# Patient Record
Sex: MALE
[De-identification: names, ages, dates, MRNs, and addresses within clinical notes are randomized per-mention and may not be internally consistent; named-entity substitution may affect disease eponyms.]

---

## 2019-10-30 ENCOUNTER — HOSPITAL ENCOUNTER (OUTPATIENT)
Dept: HOSPITAL 90 - 3DH | Age: 59
Setting detail: OBSERVATION
Discharge: HOME | End: 2019-10-30
Attending: INTERNAL MEDICINE | Admitting: INTERNAL MEDICINE
Payer: COMMERCIAL

## 2019-10-30 VITALS — SYSTOLIC BLOOD PRESSURE: 141 MMHG | DIASTOLIC BLOOD PRESSURE: 71 MMHG

## 2019-10-30 VITALS — DIASTOLIC BLOOD PRESSURE: 68 MMHG | SYSTOLIC BLOOD PRESSURE: 132 MMHG

## 2019-10-30 VITALS — SYSTOLIC BLOOD PRESSURE: 129 MMHG | DIASTOLIC BLOOD PRESSURE: 58 MMHG

## 2019-10-30 VITALS — WEIGHT: 148.1 LBS | BODY MASS INDEX: 27.25 KG/M2 | HEIGHT: 62 IN

## 2019-10-30 VITALS — SYSTOLIC BLOOD PRESSURE: 144 MMHG | DIASTOLIC BLOOD PRESSURE: 69 MMHG

## 2019-10-30 VITALS — SYSTOLIC BLOOD PRESSURE: 132 MMHG | DIASTOLIC BLOOD PRESSURE: 71 MMHG

## 2019-10-30 DIAGNOSIS — Z79.899: ICD-10-CM

## 2019-10-30 DIAGNOSIS — E11.9: ICD-10-CM

## 2019-10-30 DIAGNOSIS — R00.1: ICD-10-CM

## 2019-10-30 DIAGNOSIS — I10: ICD-10-CM

## 2019-10-30 DIAGNOSIS — Z79.84: ICD-10-CM

## 2019-10-30 DIAGNOSIS — K85.90: Primary | ICD-10-CM

## 2019-10-30 DIAGNOSIS — E78.5: ICD-10-CM

## 2019-10-30 DIAGNOSIS — R11.2: ICD-10-CM

## 2019-10-30 LAB
ALBUMIN SERPL-MCNC: 3.7 G/DL (ref 3.5–5)
ALT SERPL-CCNC: 19 U/L (ref 12–78)
AST SERPL-CCNC: 17 U/L (ref 10–37)
BASOPHILS NFR BLD AUTO: 0.2 % (ref 0–5)
BILIRUB SERPL-MCNC: 1.4 MG/DL (ref 0.2–1)
BUN SERPL-MCNC: 11 MG/DL (ref 7–18)
CHLORIDE SERPL-SCNC: 105 MMOL/L (ref 101–111)
CO2 SERPL-SCNC: 27 MMOL/L (ref 21–32)
CREAT SERPL-MCNC: 0.9 MG/DL (ref 0.5–1.5)
EOSINOPHIL NFR BLD AUTO: 1.6 % (ref 0–8)
ERYTHROCYTE [DISTWIDTH] IN BLOOD BY AUTOMATED COUNT: 13.2 % (ref 11–15.5)
GFR SERPL CREATININE-BSD FRML MDRD: 91 ML/MIN (ref 60–?)
GLUCOSE SERPL-MCNC: 151 MG/DL (ref 70–105)
HCT VFR BLD AUTO: 37.5 % (ref 42–54)
LYMPHOCYTES NFR SPEC AUTO: 20.9 % (ref 21–51)
MCH RBC QN AUTO: 30.5 PG (ref 27–33)
MCHC RBC AUTO-ENTMCNC: 34.8 G/DL (ref 32–36)
MCV RBC AUTO: 87.6 FL (ref 79–99)
MONOCYTES NFR BLD AUTO: 10.4 % (ref 3–13)
NEUTROPHILS NFR BLD AUTO: 66.9 % (ref 40–77)
NRBC BLD MANUAL-RTO: 0.1 % (ref 0–0.19)
PH UR STRIP: 7.5 [PH] (ref 5–8)
PLATELET # BLD AUTO: 187 K/UL (ref 130–400)
POTASSIUM SERPL-SCNC: 3.9 MMOL/L (ref 3.5–5.1)
PROT SERPL-MCNC: 6 G/DL (ref 6–8.3)
RBC # BLD AUTO: 4.28 MIL/UL (ref 4.5–6.2)
SODIUM SERPL-SCNC: 140 MMOL/L (ref 136–145)
SP GR UR STRIP: 1.05 (ref 1–1.03)
UROBILINOGEN UR STRIP-MCNC: 0.2 MG/DL (ref 0.2–1)
WBC # BLD AUTO: 8.1 K/UL (ref 4.8–10.8)

## 2019-10-30 PROCEDURE — 93005 ELECTROCARDIOGRAM TRACING: CPT

## 2019-10-30 PROCEDURE — 80053 COMPREHEN METABOLIC PANEL: CPT

## 2019-10-30 PROCEDURE — 84484 ASSAY OF TROPONIN QUANT: CPT

## 2019-10-30 PROCEDURE — 36415 COLL VENOUS BLD VENIPUNCTURE: CPT

## 2019-10-30 PROCEDURE — 96374 THER/PROPH/DIAG INJ IV PUSH: CPT

## 2019-10-30 PROCEDURE — 82948 REAGENT STRIP/BLOOD GLUCOSE: CPT

## 2019-10-30 PROCEDURE — 85025 COMPLETE CBC W/AUTO DIFF WBC: CPT

## 2019-10-30 PROCEDURE — 96361 HYDRATE IV INFUSION ADD-ON: CPT

## 2019-10-30 PROCEDURE — 96372 THER/PROPH/DIAG INJ SC/IM: CPT

## 2019-10-30 PROCEDURE — 81003 URINALYSIS AUTO W/O SCOPE: CPT

## 2019-10-30 RX ADMIN — SODIUM CHLORIDE SCH MLS/MIN: 0.9 INJECTION, SOLUTION INTRAVENOUS at 02:09

## 2019-10-30 RX ADMIN — SODIUM CHLORIDE SCH MLS/MIN: 0.9 INJECTION, SOLUTION INTRAVENOUS at 06:17

## 2019-10-30 RX ADMIN — SODIUM CHLORIDE SCH MLS/MIN: 0.9 INJECTION, SOLUTION INTRAVENOUS at 06:04

## 2019-10-30 RX ADMIN — SODIUM CHLORIDE SCH MLS/MIN: 0.9 INJECTION, SOLUTION INTRAVENOUS at 06:09

## 2019-10-30 RX ADMIN — SODIUM CHLORIDE SCH UNIT: 9 INJECTION, SOLUTION INTRAVENOUS at 11:30

## 2019-10-30 RX ADMIN — SODIUM CHLORIDE SCH UNIT: 9 INJECTION, SOLUTION INTRAVENOUS at 05:33

## 2019-10-30 RX ADMIN — SODIUM CHLORIDE SCH MLS/MIN: 0.9 INJECTION, SOLUTION INTRAVENOUS at 04:17

## 2019-10-30 RX ADMIN — SODIUM CHLORIDE SCH UNIT: 9 INJECTION, SOLUTION INTRAVENOUS at 16:30

## 2019-10-30 NOTE — NUR
REviewed labs and exams with Dr. Gibbons, received orders to advance diet to [facility] and 
advance if tolerated.

## 2019-10-30 NOTE — NUR
DISCHARGED NOW USING TEACH BACK. INST. GIVEN AND BOTH HE AND HIS WIFE VERBALIZED 
UNDERSTANDING. WILL FOLLOW UP WITH PCP IN 2 TO 3 DAYS. SALINE LOCK REMOVED PRIOR TO DC. 
TOLERATED DIET WELL, NO FURTHER ABD. PAIN.

## 2019-10-30 NOTE — NUR
Admission note:



Arrived from [facility] [place] via EMS ( [facility] AOx4. Pt. very cooperative. 
Placed in bed comfortably. Denies feeling of pain at this time. Has IV site to RAC #20 g 
with NS 1L at 700 cc level - patent and intact. House Supervisor and on-call Hospitalist 
made aware.VS checked and recorded. Assessment done. (See [facility]PO[facility] flow chart for full 
assessment). ARCADIO Betancourt came in to see and assess pt. Orders checked and carried out. 
Plan of care initiated. Oriented to room and use of call light. Policies and procedures 
explained. Verbalized understanding. Kept monitored and watched out for any unusual changes. 
Needs attended and cared for. No apparent distress noted.